# Patient Record
Sex: MALE | Race: WHITE | NOT HISPANIC OR LATINO | URBAN - METROPOLITAN AREA
[De-identification: names, ages, dates, MRNs, and addresses within clinical notes are randomized per-mention and may not be internally consistent; named-entity substitution may affect disease eponyms.]

---

## 2023-10-17 ENCOUNTER — PHARMACY VISIT (OUTPATIENT)
Dept: PHARMACY | Facility: CLINIC | Age: 68
End: 2023-10-17
Payer: COMMERCIAL

## 2023-10-17 ENCOUNTER — SPECIALTY PHARMACY (OUTPATIENT)
Dept: PHARMACY | Facility: CLINIC | Age: 68
End: 2023-10-17

## 2023-10-23 ENCOUNTER — SPECIALTY PHARMACY (OUTPATIENT)
Dept: PHARMACY | Facility: CLINIC | Age: 68
End: 2023-10-23

## 2023-10-23 PROCEDURE — RXMED WILLOW AMBULATORY MEDICATION CHARGE

## 2023-11-17 ENCOUNTER — SPECIALTY PHARMACY (OUTPATIENT)
Dept: PHARMACY | Facility: CLINIC | Age: 68
End: 2023-11-17

## 2023-11-20 ENCOUNTER — SPECIALTY PHARMACY (OUTPATIENT)
Dept: PHARMACY | Facility: CLINIC | Age: 68
End: 2023-11-20

## 2023-11-21 ENCOUNTER — PHARMACY VISIT (OUTPATIENT)
Dept: PHARMACY | Facility: CLINIC | Age: 68
End: 2023-11-21
Payer: COMMERCIAL

## 2023-11-21 PROCEDURE — RXMED WILLOW AMBULATORY MEDICATION CHARGE

## 2023-11-21 RX ORDER — RUXOLITINIB 10 MG/1
TABLET ORAL
Qty: 60 TABLET | Refills: 5 | OUTPATIENT
Start: 2023-11-20 | End: 2023-12-18 | Stop reason: DRUGHIGH

## 2023-12-07 PROCEDURE — 88305 TISSUE EXAM BY PATHOLOGIST: CPT | Performed by: DERMATOLOGY

## 2023-12-07 PROCEDURE — 88305 TISSUE EXAM BY PATHOLOGIST: CPT

## 2023-12-11 ENCOUNTER — LAB REQUISITION (OUTPATIENT)
Dept: LAB | Facility: HOSPITAL | Age: 68
End: 2023-12-11
Payer: COMMERCIAL

## 2023-12-11 DIAGNOSIS — D48.5 NEOPLASM OF UNCERTAIN BEHAVIOR OF SKIN: ICD-10-CM

## 2023-12-12 ENCOUNTER — SPECIALTY PHARMACY (OUTPATIENT)
Dept: PHARMACY | Facility: CLINIC | Age: 68
End: 2023-12-12

## 2023-12-12 LAB
LABORATORY COMMENT REPORT: NORMAL
PATH REPORT.FINAL DX SPEC: NORMAL
PATH REPORT.GROSS SPEC: NORMAL
PATH REPORT.MICROSCOPIC SPEC OTHER STN: NORMAL
PATH REPORT.RELEVANT HX SPEC: NORMAL
PATH REPORT.TOTAL CANCER: NORMAL

## 2023-12-18 PROCEDURE — RXMED WILLOW AMBULATORY MEDICATION CHARGE

## 2023-12-19 ENCOUNTER — SPECIALTY PHARMACY (OUTPATIENT)
Dept: PHARMACY | Facility: CLINIC | Age: 68
End: 2023-12-19

## 2023-12-19 ENCOUNTER — PHARMACY VISIT (OUTPATIENT)
Dept: PHARMACY | Facility: CLINIC | Age: 68
End: 2023-12-19
Payer: COMMERCIAL

## 2023-12-26 ENCOUNTER — SPECIALTY PHARMACY (OUTPATIENT)
Dept: PHARMACY | Facility: CLINIC | Age: 68
End: 2023-12-26

## 2023-12-26 NOTE — PROGRESS NOTES
[facility] Clinical Note    Valeriy Negron is a 68 y.o. male, who is on the specialty pharmacy service for management of: Oncology Core with status of: (Enrolled)     Valeriy was contacted on 12/26/2023.    Refer to the encounter summary report for documentation details about patient counseling and education.      Medication Adherence  The importance of adherence was discussed with the patient and they were advised to take the medication as prescribed by their provider. Valeriy was encouraged to call his physician's office if they have a question regarding a missed dose.     Conclusion  Rate your quality of life on scale of 1-10: 8 (Recently retired)  Rate your satisfaction with  Specialty Pharmacy on scale of 1-10: 10 - Completely satisfied      Patient advised to contact the pharmacy if there are any changes to her medication list, including prescriptions, OTC medications, herbal products, or supplements. Patient was advised of [facility]’s dispensing process, refill timeline, contact information ([phone]), and patient management follow up. Patient confirmed understanding of education conducted during assessment. All patient questions and concerns were addressed to the best of my ability. Patient was encouraged to contact the specialty pharmacy with any questions or concerns.    Confirmed follow-up outreaches are properly scheduled. Reviewed goals of therapy in the program targets.    Carla Loja, PharmD

## 2024-01-10 ENCOUNTER — SPECIALTY PHARMACY (OUTPATIENT)
Dept: PHARMACY | Facility: CLINIC | Age: 69
End: 2024-01-10

## 2024-01-10 PROCEDURE — RXMED WILLOW AMBULATORY MEDICATION CHARGE

## 2024-01-11 ENCOUNTER — PHARMACY VISIT (OUTPATIENT)
Dept: PHARMACY | Facility: CLINIC | Age: 69
End: 2024-01-11
Payer: COMMERCIAL

## 2024-02-02 ENCOUNTER — SPECIALTY PHARMACY (OUTPATIENT)
Dept: PHARMACY | Facility: CLINIC | Age: 69
End: 2024-02-02

## 2024-02-02 ENCOUNTER — PHARMACY VISIT (OUTPATIENT)
Dept: PHARMACY | Facility: CLINIC | Age: 69
End: 2024-02-02
Payer: COMMERCIAL

## 2024-02-02 PROCEDURE — RXMED WILLOW AMBULATORY MEDICATION CHARGE

## 2024-03-07 ENCOUNTER — SPECIALTY PHARMACY (OUTPATIENT)
Dept: PHARMACY | Facility: CLINIC | Age: 69
End: 2024-03-07

## 2024-04-05 ENCOUNTER — SPECIALTY PHARMACY (OUTPATIENT)
Dept: PHARMACY | Facility: CLINIC | Age: 69
End: 2024-04-05

## 2024-08-09 ENCOUNTER — APPOINTMENT (OUTPATIENT)
Dept: HEMATOLOGY/ONCOLOGY | Facility: HOSPITAL | Age: 69
End: 2024-08-09

## 2024-08-15 ENCOUNTER — APPOINTMENT (OUTPATIENT)
Dept: HEMATOLOGY/ONCOLOGY | Facility: HOSPITAL | Age: 69
End: 2024-08-15
Payer: COMMERCIAL

## 2024-08-27 ENCOUNTER — APPOINTMENT (OUTPATIENT)
Dept: HEMATOLOGY/ONCOLOGY | Facility: CLINIC | Age: 69
End: 2024-08-27
Payer: COMMERCIAL

## 2024-09-17 ENCOUNTER — APPOINTMENT (OUTPATIENT)
Dept: HEMATOLOGY/ONCOLOGY | Facility: CLINIC | Age: 69
End: 2024-09-17
Payer: COMMERCIAL

## 2024-10-18 ENCOUNTER — LAB (OUTPATIENT)
Dept: LAB | Facility: HOSPITAL | Age: 69
End: 2024-10-18
Payer: MEDICARE

## 2024-10-18 ENCOUNTER — OFFICE VISIT (OUTPATIENT)
Dept: HEMATOLOGY/ONCOLOGY | Facility: HOSPITAL | Age: 69
End: 2024-10-18
Payer: MEDICARE

## 2024-10-18 ENCOUNTER — DOCUMENTATION (OUTPATIENT)
Dept: OTHER | Facility: HOSPITAL | Age: 69
End: 2024-10-18
Payer: COMMERCIAL

## 2024-10-18 VITALS
OXYGEN SATURATION: 100 % | SYSTOLIC BLOOD PRESSURE: 131 MMHG | WEIGHT: 203 LBS | TEMPERATURE: 96.8 F | HEART RATE: 53 BPM | DIASTOLIC BLOOD PRESSURE: 80 MMHG | BODY MASS INDEX: 26.79 KG/M2

## 2024-10-18 DIAGNOSIS — D45 PV (POLYCYTHEMIA VERA) (MULTI): ICD-10-CM

## 2024-10-18 DIAGNOSIS — D45 POLYCYTHEMIA VERA: ICD-10-CM

## 2024-10-18 DIAGNOSIS — Z76.82 STEM CELL TRANSPLANT CANDIDATE: ICD-10-CM

## 2024-10-18 DIAGNOSIS — I63.9 CEREBROVASCULAR ACCIDENT (CVA), UNSPECIFIED MECHANISM (MULTI): Primary | ICD-10-CM

## 2024-10-18 LAB
ALBUMIN SERPL BCP-MCNC: 4.7 G/DL (ref 3.4–5)
ALP SERPL-CCNC: 44 U/L (ref 33–136)
ALT SERPL W P-5'-P-CCNC: 23 U/L (ref 10–52)
ANION GAP SERPL CALC-SCNC: 11 MMOL/L (ref 10–20)
APTT PPP: 34 SECONDS (ref 27–38)
AST SERPL W P-5'-P-CCNC: 28 U/L (ref 9–39)
BASOPHILS # BLD MANUAL: 0.72 X10*3/UL (ref 0–0.1)
BASOPHILS NFR BLD MANUAL: 4 %
BILIRUB SERPL-MCNC: 0.9 MG/DL (ref 0–1.2)
BUN SERPL-MCNC: 18 MG/DL (ref 6–23)
BURR CELLS BLD QL SMEAR: ABNORMAL
CALCIUM SERPL-MCNC: 10.2 MG/DL (ref 8.6–10.3)
CHLORIDE SERPL-SCNC: 104 MMOL/L (ref 98–107)
CO2 SERPL-SCNC: 28 MMOL/L (ref 21–32)
CREAT SERPL-MCNC: 0.88 MG/DL (ref 0.5–1.3)
DACRYOCYTES BLD QL SMEAR: ABNORMAL
EGFRCR SERPLBLD CKD-EPI 2021: >90 ML/MIN/1.73M*2
EOSINOPHIL # BLD MANUAL: 1.79 X10*3/UL (ref 0–0.7)
EOSINOPHIL NFR BLD MANUAL: 10 %
ERYTHROCYTE [DISTWIDTH] IN BLOOD BY AUTOMATED COUNT: 18.8 % (ref 11.5–14.5)
FACT VIII ACT/NOR PPP: 103 % (ref 55–180)
FIBRINOGEN PPP-MCNC: 257 MG/DL (ref 200–400)
GLUCOSE SERPL-MCNC: 94 MG/DL (ref 74–99)
HCT VFR BLD AUTO: 38.2 % (ref 41–52)
HGB BLD-MCNC: 11.9 G/DL (ref 13.5–17.5)
IMM GRANULOCYTES # BLD AUTO: 1.92 X10*3/UL (ref 0–0.7)
IMM GRANULOCYTES NFR BLD AUTO: 10.7 % (ref 0–0.9)
INR PPP: 1.1 (ref 0.9–1.1)
LDH SERPL L TO P-CCNC: 330 U/L (ref 84–246)
LYMPHOCYTES # BLD MANUAL: 0.9 X10*3/UL (ref 1.2–4.8)
LYMPHOCYTES NFR BLD MANUAL: 5 %
MCH RBC QN AUTO: 31.2 PG (ref 26–34)
MCHC RBC AUTO-ENTMCNC: 31.2 G/DL (ref 32–36)
MCV RBC AUTO: 100 FL (ref 80–100)
METAMYELOCYTES # BLD MANUAL: 0.54 X10*3/UL
METAMYELOCYTES NFR BLD MANUAL: 3 %
MONOCYTES # BLD MANUAL: 0.9 X10*3/UL (ref 0.1–1)
MONOCYTES NFR BLD MANUAL: 5 %
MYELOCYTES # BLD MANUAL: 0.9 X10*3/UL
MYELOCYTES NFR BLD MANUAL: 5 %
NEUTROPHILS # BLD MANUAL: 11.99 X10*3/UL (ref 1.2–7.7)
NEUTS BAND # BLD MANUAL: 1.61 X10*3/UL (ref 0–0.7)
NEUTS BAND NFR BLD MANUAL: 9 %
NEUTS SEG # BLD MANUAL: 10.38 X10*3/UL (ref 1.2–7)
NEUTS SEG NFR BLD MANUAL: 58 %
NRBC BLD-RTO: 0.5 /100 WBCS (ref 0–0)
OVALOCYTES BLD QL SMEAR: ABNORMAL
PLATELET # BLD AUTO: 414 X10*3/UL (ref 150–450)
POLYCHROMASIA BLD QL SMEAR: ABNORMAL
POTASSIUM SERPL-SCNC: 4.2 MMOL/L (ref 3.5–5.3)
PROT SERPL-MCNC: 7 G/DL (ref 6.4–8.2)
PROTHROMBIN TIME: 12.4 SECONDS (ref 9.8–12.8)
RBC # BLD AUTO: 3.82 X10*6/UL (ref 4.5–5.9)
RBC MORPH BLD: ABNORMAL
SCHISTOCYTES BLD QL SMEAR: ABNORMAL
SODIUM SERPL-SCNC: 139 MMOL/L (ref 136–145)
TOTAL CELLS COUNTED BLD: 100
URATE SERPL-MCNC: 4.8 MG/DL (ref 4–7.5)
VARIANT LYMPHS # BLD MANUAL: 0.18 X10*3/UL (ref 0–0.5)
VARIANT LYMPHS NFR BLD: 1 %
VWF AG ACT/NOR PPP IA: 113 % (ref 50–220)
WBC # BLD AUTO: 17.9 X10*3/UL (ref 4.4–11.3)

## 2024-10-18 PROCEDURE — 81371 HLA I & II TYPE VERIFY LR: CPT | Mod: OUT,59 | Performed by: STUDENT IN AN ORGANIZED HEALTH CARE EDUCATION/TRAINING PROGRAM

## 2024-10-18 PROCEDURE — 85246 CLOT FACTOR VIII VW ANTIGEN: CPT

## 2024-10-18 PROCEDURE — 83615 LACTATE (LD) (LDH) ENZYME: CPT

## 2024-10-18 PROCEDURE — 81382 HLA II TYPING 1 LOC HR: CPT | Mod: 59

## 2024-10-18 PROCEDURE — 85247 CLOT FACTOR VIII MULTIMETRIC: CPT

## 2024-10-18 PROCEDURE — 1126F AMNT PAIN NOTED NONE PRSNT: CPT | Performed by: STUDENT IN AN ORGANIZED HEALTH CARE EDUCATION/TRAINING PROGRAM

## 2024-10-18 PROCEDURE — 84550 ASSAY OF BLOOD/URIC ACID: CPT

## 2024-10-18 PROCEDURE — 85730 THROMBOPLASTIN TIME PARTIAL: CPT

## 2024-10-18 PROCEDURE — 85610 PROTHROMBIN TIME: CPT

## 2024-10-18 PROCEDURE — 99215 OFFICE O/P EST HI 40 MIN: CPT | Performed by: STUDENT IN AN ORGANIZED HEALTH CARE EDUCATION/TRAINING PROGRAM

## 2024-10-18 PROCEDURE — 36415 COLL VENOUS BLD VENIPUNCTURE: CPT

## 2024-10-18 PROCEDURE — 85245 CLOT FACTOR VIII VW RISTOCTN: CPT

## 2024-10-18 PROCEDURE — 85240 CLOT FACTOR VIII AHG 1 STAGE: CPT

## 2024-10-18 PROCEDURE — 1159F MED LIST DOCD IN RCRD: CPT | Performed by: STUDENT IN AN ORGANIZED HEALTH CARE EDUCATION/TRAINING PROGRAM

## 2024-10-18 PROCEDURE — 86832 HLA CLASS I HIGH DEFIN QUAL: CPT

## 2024-10-18 PROCEDURE — 84075 ASSAY ALKALINE PHOSPHATASE: CPT

## 2024-10-18 PROCEDURE — 85027 COMPLETE CBC AUTOMATED: CPT

## 2024-10-18 PROCEDURE — 85007 BL SMEAR W/DIFF WBC COUNT: CPT

## 2024-10-18 PROCEDURE — 85384 FIBRINOGEN ACTIVITY: CPT

## 2024-10-18 RX ORDER — CLOPIDOGREL BISULFATE 75 MG/1
75 TABLET ORAL DAILY
COMMUNITY

## 2024-10-18 RX ORDER — GLUCOSAM/CHONDRO/HERB 149/HYAL 750-100 MG
1000 TABLET ORAL DAILY
COMMUNITY

## 2024-10-18 RX ORDER — ATORVASTATIN CALCIUM 80 MG/1
80 TABLET, FILM COATED ORAL DAILY
COMMUNITY

## 2024-10-18 RX ORDER — GLUCOSAMINE/CHONDRO SU A 500-400 MG
1 TABLET ORAL DAILY
COMMUNITY

## 2024-10-18 RX ORDER — MINERAL OIL
180 ENEMA (ML) RECTAL DAILY
COMMUNITY

## 2024-10-18 RX ORDER — AMLODIPINE BESYLATE 5 MG/1
TABLET ORAL DAILY
COMMUNITY

## 2024-10-18 RX ORDER — LOSARTAN POTASSIUM AND HYDROCHLOROTHIAZIDE 12.5; 1 MG/1; MG/1
1 TABLET ORAL DAILY
COMMUNITY

## 2024-10-18 RX ORDER — FLUTICASONE PROPIONATE 50 MCG
1 SPRAY, SUSPENSION (ML) NASAL DAILY
COMMUNITY

## 2024-10-18 RX ORDER — ASPIRIN 81 MG/1
81 TABLET ORAL DAILY
COMMUNITY

## 2024-10-18 ASSESSMENT — PAIN SCALES - GENERAL: PAINLEVEL_OUTOF10: 0-NO PAIN

## 2024-10-18 NOTE — PROGRESS NOTES
Patient ID: Valeriy Negron is a 69 y.o. male.  Referring Physician: Camilla Mishra MD  [address]  [place] [address]  Primary Care Provider: Fernando Valencia MD    Oncology History Overview Note   PV Diagnosis:  - Increased immature granulocytes starting in 2015  - CBC 1/2020:  WBC 12.6, eos 3.226, basos 4.03, Hgb 18.2, plts 416  - CBC 8/11/21:  WBC 12.6, ANC 8.6, Eos 2.91, basos 0.62, Hgb 18.1, plts 443  - Referred to Dr. Mishra ([facility]), evaluated 9/3/21.  Epo 1.4 mIU/mL, folate normal  - BMBx 10/8/21 was hypercellular (50-60%) with panmyelosis, MK atypia, no increase in blasts, no increase in reticulin fibrosis (MF-0), and JAK2 V617F mutation by myeloid NGS consistent with JAK2 positive MPN, most consistent with PV.  Other mutation by NGS:  SF3B1 K666N (associated with inferior survival in ET).  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  JAK2 V617F mut detected.  FISH neg for rearrangements in PDGFRa, PDGFRb, FGFR1, BCR/ABL.  CBC:  WBC 17.4 (no blasts), Hgb 11.2 (MCV 96.7), pts 410.    PV Management:  Phlebotomy 10/22/21-1/2022  Hydrea 500 BID 11/2021-9/2023  ASA held 2023, resumed 4/2024  Rux 9/2023-present (10mg BID -> 15 mg BID)    CVA History:  4/2023:  corona radiata stroke.  Remembers he was watching TV, went to get up, had a hard time getting up, then fell.  Got up, tried to take another step and fell.  Went into the bedroom, fell, took 1 day off, then went back to work the next day.  Saw GP on Saturday, had PT, started Plavix and statin.  Referred to a neurologist, was told he had imaging evidence of a previous CVA as well.      4/2024:  thirdCVA.  Went to the gym that morning.  Couldn't move right side.  Called squad, distal L A2 occlusion, got thrombolysis.  Loop recorder 6 weeks later.     Polycythemia vera   10/21/2024 Initial  "Diagnosis    Polycythemia vera            Subjective    Mr. Negron presents today for repeat consultation for polycythemia vera.  Diagnosis and treatment history summarized above.    Current active issues are recurrent strokes (despite hematocrit at goal) and bruising/bleeding on DAPT.  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    Stroke history summarized above.  Regarding residual deficits, he has difficulty with short term memory and name recall (started before strokes but more noticeable afterwards).  Has to be aware of R foot, as it often \"catches\" on things if he isn't careful.  Also reports some deficits in R handed dexterity.    Regarding bruising/bleeding, he had intractable bleeding after loop recorder placement (DAPT was not held).  He also bruises easily.  He has no noticeable oozing from his mouth, bleeding when he brushes his teeth, hematuria, hematochezia, or melena.    He has an excellent performance status; he recently returned from a week of golf in [place].  Ready to go back to the gym (elliptical, rowing machine, lifts weights \"a little\").  Headed to [place] in a few weeks.  He helps babysit his grandson who is 22 months old.      Vision problems \"fixed with prisms\" added to his glasses.      PMH:  HTN     PSH:  5 knee surgeries (b/l TKA)     FH:  MGM - MI  Mother -  of lymphoma age 85 (unknown subtype, was in the abdomen)  Father -  of DM complications when pt was 6 yo  Sister - psoriasis  Half sister and half brother - healthy  Son - 39 yo, healthy     SH:  lives with wife  works as an , denies occupational exposures  never smoker  drinks wine occasionally  no illicits  no pets    Review of Systems   Constitutional: Negative.    HENT:  Negative.     Eyes: Negative.    Respiratory: Negative.     Cardiovascular: Negative.    Gastrointestinal: Negative.    Endocrine: Negative.  "   Genitourinary: Negative.     Musculoskeletal:  Positive for gait problem.   Skin: Negative.    Neurological:  Positive for gait problem.        Short term memory difficulties   Hematological:  Bruises/bleeds easily.   Psychiatric/Behavioral: Negative.           Objective   BSA: 2.18 meters squared  /80 (BP Location: Right arm, Patient Position: Sitting, BP Cuff Size: Adult)   Pulse 53   Temp 36 °C (96.8 °F) (Temporal)   Wt 92.1 kg (203 lb)   SpO2 100%   BMI 26.79 kg/m²       Physical Exam  Vitals reviewed.   HENT:      Head: Normocephalic and atraumatic.      Right Ear: External ear normal.      Left Ear: External ear normal.      Nose: Nose normal.      Mouth/Throat:      Mouth: Mucous membranes are moist.      Pharynx: Oropharynx is clear.   Eyes:      Extraocular Movements: Extraocular movements intact.      Conjunctiva/sclera: Conjunctivae normal.      Pupils: Pupils are equal, round, and reactive to light.   Cardiovascular:      Rate and Rhythm: Normal rate and regular rhythm.   Pulmonary:      Effort: Pulmonary effort is normal.      Breath sounds: Normal breath sounds.   Abdominal:      Palpations: Abdomen is soft.      Tenderness: There is no abdominal tenderness.   Musculoskeletal:         General: Normal range of motion.      Cervical back: Normal range of motion.   Skin:     General: Skin is warm and dry.   Neurological:      General: No focal deficit present.      Mental Status: He is alert.   Psychiatric:         Mood and Affect: Mood normal.         Behavior: Behavior normal.         Thought Content: Thought content normal.       CT a/p 5/2024:  no splenomegaly    Results from last 7 days   Lab Units 10/18/24  1321   WBC AUTO x10*3/uL 17.9*   HEMOGLOBIN g/dL 11.9*   HEMATOCRIT % 38.2*   PLATELETS AUTO x10*3/uL 414   LYMPHO PCT MAN % 5.0   MONO PCT MAN % 5.0   EOSINO PCT MAN % 10.0       Results from last 7 days   Lab Units 10/18/24  1321   SODIUM mmol/L 139   POTASSIUM mmol/L 4.2    CHLORIDE mmol/L 104   CO2 mmol/L 28   BUN mg/dL 18   CREATININE mg/dL 0.88   CALCIUM mg/dL 10.2   PROTEIN TOTAL g/dL 7.0   BILIRUBIN TOTAL mg/dL 0.9   ALK PHOS U/L 44   ALT U/L 23   AST U/L 28   GLUCOSE mg/dL 94              Performance Status:  Symptomatic; fully ambulatory    Assessment/Plan      Mr. Negron presents today for reassessment of PV with course c/b recurrent strokes despite successful hematocrit control (may be more related to leukocytosis).  It is also possible that his underlying diagnosis may overlap somewhat with MDS.    He is in good shape physically and has no splenomegaly (as of imaging 6 months ago), so he appears to be a good transplant candidate if diagnosis is appropriate.  We discussed that transplant could be a potentially curative treatment for him, and he is amenable to workup.    Plan:  - Repeat BMBx (CT guided) for diagnostic clarification  - I will refer him to stroke neurology here for opinion on optimal AC and to help clarify etiology of recurrent CVAs  - HLA typing and allo transplant referral  - Will obtain von Willebrand studies to rule out acquired vWD although I suspect bruising/bleeding is more related to DAPT  - Follow up with me on 12/3 at [place]         Susi Julio MD PhD

## 2024-10-18 NOTE — PROGRESS NOTES
Patient Education  Learner: patient and significant other  Educated on: 10/17/24  Readiness: acceptance  Preferred learning method: written, verbal, video  Method used: explanation, handout, and video  Response: needs reinforcement  Barriers: none  Preferred language: English    I met with Valeriy and his wife Ada today for allogenic stem cell transplant education. We discussed the general concept of transplant including chemotherapy administration and cell infusion. We reviewed the workup process including organ function testing and laboratory testing, required for MD and financial clearance to proceed with stem cell collection and transplant. We discussed hospital admission for transplant and that Valeriy will remain in the hospital for 4-6 weeks. Pt provided Allo binder, [facility] Allo education video link and Allo education checklist. We do not know donor or preparatory regimen Valeriy will be receiving if it is determined he will pursue transplant.  Administration and potential side effects of this Allo PBSCT were reviewed and patient will be provided Lexicomp material specific to all medications once known. We discussed neutropenia, anemia, thrombocytopenia and the potential complications associated with these events. Infection prevention measures such as strict hand washing, low pathogen diet, daily showering/CHG bathing and frequent walking were reviewed.  Patient was provided with patient information sheets on transplant related topics. 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 GVHD booklet provided. Anti rejection meds reviewed as well. We discussed the goals of discharge and the need for a caregiver and someone to accompany him to post transplant visits in the [facility], which will occur 2-3 times per week, at minimum.  We discussed the importance of having a reliable caregiver post-transplant to drive patient to appointments, help with medication adherence, and assist with ADLS such as  cooking and cleaning. The patient verbalized understanding of these measures and his wife states that she will be the primary caregiver. They live in [place] so lodging will not be needed. Patient will be  given copies of all applicable consents related to upcoming therapy and transplant so they can review prior to signing with Dr. Julio Attending. They are aware SW will be reaching out once it is decided if moving forward with transplant and timeline established. My contact information provided.

## 2024-10-18 NOTE — LETTER
October 21, 2024     Camilla Mishra MD  [address]  [facility] [address]    Patient: Valeriy Negron   YOB: 1955   Date of Visit: 10/18/2024       Dear Dr. Camilla Mishra MD:    Thank you for referring Valeriy Negron to me for evaluation. Below are my notes for this consultation.  If you have questions, please do not hesitate to call me. I look forward to following your patient along with you.       Sincerely,     Susi Julio MD PhD      CC: MD Fernando Lara MD  ______________________________________________________________________________________    Patient ID: Valeriy Negron is a 69 y.o. male.  Referring Physician: Camilla Mishra MD  [address]  [place] [address]  Primary Care Provider: Fernando Valencia MD    Oncology History Overview Note   PV Diagnosis:  - Increased immature granulocytes starting in 2015  - CBC 1/2020:  WBC 12.6, eos 3.226, basos 4.03, Hgb 18.2, plts 416  - CBC 8/11/21:  WBC 12.6, ANC 8.6, Eos 2.91, basos 0.62, Hgb 18.1, plts 443  - Referred to Dr. Mishra ([facility]), evaluated 9/3/21.  Epo 1.4 mIU/mL, folate normal  - BMBx 10/8/21 was hypercellular (50-60%) with panmyelosis, MK atypia, no increase in blasts, no increase in reticulin fibrosis (MF-0), and JAK2 V617F mutation by myeloid NGS consistent with JAK2 positive MPN, most consistent with PV.  Other mutation by NGS:  SF3B1 K666N (associated with inferior survival in ET).  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  JAK2 V617F mut detected.  FISH neg for rearrangements in PDGFRa, PDGFRb, FGFR1, BCR/ABL.  CBC:  WBC 17.4 (no blasts), Hgb 11.2 (MCV 96.7), pts 410.    PV Management:  Phlebotomy 10/22/21-1/2022  Hydrea 500 BID 11/2021-9/2023  ASA held 2023, resumed 4/2024  Rux 9/2023-present  "(10mg BID -> 15 mg BID)    CVA History:  4/2023:  corona radiata stroke.  Remembers he was watching TV, went to get up, had a hard time getting up, then fell.  Got up, tried to take another step and fell.  Went into the bedroom, fell, took 1 day off, then went back to work the next day.  Saw GP on Saturday, had PT, started Plavix and statin.  Referred to a neurologist, was told he had imaging evidence of a previous CVA as well.      4/2024:  thirdCVA.  Went to the gym that morning.  Couldn't move right side.  Called squad, distal L A2 occlusion, got thrombolysis.  Loop recorder 6 weeks later.     Polycythemia vera   10/21/2024 Initial Diagnosis    Polycythemia vera            Subjective   Mr. Negron presents today for repeat consultation for polycythemia vera.  Diagnosis and treatment history summarized above.    Current active issues are recurrent strokes (despite hematocrit at goal) and bruising/bleeding on DAPT.  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    Stroke history summarized above.  Regarding residual deficits, he has difficulty with short term memory and name recall (started before strokes but more noticeable afterwards).  Has to be aware of R foot, as it often \"catches\" on things if he isn't careful.  Also reports some deficits in R handed dexterity.    Regarding bruising/bleeding, he had intractable bleeding after loop recorder placement (DAPT was not held).  He also bruises easily.  He has no noticeable oozing from his mouth, bleeding when he brushes his teeth, hematuria, hematochezia, or melena.    He has an excellent performance status; he recently returned from a week of golf in [place].  Ready to go back to the gym (elliptical, rowing machine, lifts weights \"a little\").  Headed to [place] in a few weeks.  He helps babysit his grandson who is 22 months old.      Vision problems \"fixed with " "prisms\" added to his glasses.      PMH:  HTN     PSH:  5 knee surgeries (b/l TKA)     FH:  MGM - MI  Mother -  of lymphoma age 85 (unknown subtype, was in the abdomen)  Father -  of DM complications when pt was 4 yo  Sister - psoriasis  Half sister and half brother - healthy  Son - 37 yo, healthy     SH:  lives with wife  works as an , denies occupational exposures  never smoker  drinks wine occasionally  no illicits  no pets    Review of Systems   Constitutional: Negative.    HENT:  Negative.     Eyes: Negative.    Respiratory: Negative.     Cardiovascular: Negative.    Gastrointestinal: Negative.    Endocrine: Negative.    Genitourinary: Negative.     Musculoskeletal:  Positive for gait problem.   Skin: Negative.    Neurological:  Positive for gait problem.        Short term memory difficulties   Hematological:  Bruises/bleeds easily.   Psychiatric/Behavioral: Negative.           Objective  BSA: 2.18 meters squared  /80 (BP Location: Right arm, Patient Position: Sitting, BP Cuff Size: Adult)   Pulse 53   Temp 36 °C (96.8 °F) (Temporal)   Wt 92.1 kg (203 lb)   SpO2 100%   BMI 26.79 kg/m²       Physical Exam  Vitals reviewed.   HENT:      Head: Normocephalic and atraumatic.      Right Ear: External ear normal.      Left Ear: External ear normal.      Nose: Nose normal.      Mouth/Throat:      Mouth: Mucous membranes are moist.      Pharynx: Oropharynx is clear.   Eyes:      Extraocular Movements: Extraocular movements intact.      Conjunctiva/sclera: Conjunctivae normal.      Pupils: Pupils are equal, round, and reactive to light.   Cardiovascular:      Rate and Rhythm: Normal rate and regular rhythm.   Pulmonary:      Effort: Pulmonary effort is normal.      Breath sounds: Normal breath sounds.   Abdominal:      Palpations: Abdomen is soft.      Tenderness: There is no abdominal tenderness.   Musculoskeletal:         General: Normal range of motion.      Cervical back: Normal range of " motion.   Skin:     General: Skin is warm and dry.   Neurological:      General: No focal deficit present.      Mental Status: He is alert.   Psychiatric:         Mood and Affect: Mood normal.         Behavior: Behavior normal.         Thought Content: Thought content normal.       CT a/p 5/2024:  no splenomegaly    Results from last 7 days   Lab Units 10/18/24  1321   WBC AUTO x10*3/uL 17.9*   HEMOGLOBIN g/dL 11.9*   HEMATOCRIT % 38.2*   PLATELETS AUTO x10*3/uL 414   LYMPHO PCT MAN % 5.0   MONO PCT MAN % 5.0   EOSINO PCT MAN % 10.0       Results from last 7 days   Lab Units 10/18/24  1321   SODIUM mmol/L 139   POTASSIUM mmol/L 4.2   CHLORIDE mmol/L 104   CO2 mmol/L 28   BUN mg/dL 18   CREATININE mg/dL 0.88   CALCIUM mg/dL 10.2   PROTEIN TOTAL g/dL 7.0   BILIRUBIN TOTAL mg/dL 0.9   ALK PHOS U/L 44   ALT U/L 23   AST U/L 28   GLUCOSE mg/dL 94              Performance Status:  Symptomatic; fully ambulatory    Assessment/Plan     Mr. Negron presents today for reassessment of PV with course c/b recurrent strokes despite successful hematocrit control (may be more related to leukocytosis).  It is also possible that his underlying diagnosis may overlap somewhat with MDS.    He is in good shape physically and has no splenomegaly (as of imaging 6 months ago), so he appears to be a good transplant candidate if diagnosis is appropriate.  We discussed that transplant could be a potentially curative treatment for him, and he is amenable to workup.    Plan:  - Repeat BMBx (CT guided) for diagnostic clarification  - I will refer him to stroke neurology here for opinion on optimal AC and to help clarify etiology of recurrent CVAs  - HLA typing and allo transplant referral  - Will obtain von Willebrand studies to rule out acquired vWD although I suspect bruising/bleeding is more related to DAPT  - Follow up with me on 12/3 at [place]         Susi Julio MD PhD

## 2024-10-18 NOTE — PATIENT INSTRUCTIONS
Labs today including von Willebrand testing, HLA typing, coagulation testing.  We will schedule CT guided bone marrow biopsy after your return.  I will refer you to the stroke specialist I recommend, Dr. Linda Wyatt (will coordinate if possible).  I will touch base with Dr. Mishra, Dr. Valencia, and Dr. Trivedi.

## 2024-10-21 PROBLEM — D45 POLYCYTHEMIA VERA: Status: ACTIVE | Noted: 2024-10-21

## 2024-10-21 ASSESSMENT — ENCOUNTER SYMPTOMS
EYES NEGATIVE: 1
BRUISES/BLEEDS EASILY: 1
RESPIRATORY NEGATIVE: 1
CARDIOVASCULAR NEGATIVE: 1
PSYCHIATRIC NEGATIVE: 1
CONSTITUTIONAL NEGATIVE: 1
ENDOCRINE NEGATIVE: 1
GASTROINTESTINAL NEGATIVE: 1

## 2024-10-23 LAB — VWF:RCO ACT/NOR PPP PL AGG: 82 % (ref 51–215)

## 2024-10-25 LAB
HLA RESULTS: NORMAL
HLA-A+B+C AB NFR SER: NORMAL %
HLA-DP+DQ+DR AB NFR SER: NORMAL %

## 2024-10-25 PROCEDURE — 99001 SPECIMEN HANDLING PT-LAB: CPT | Mod: OUT | Performed by: STUDENT IN AN ORGANIZED HEALTH CARE EDUCATION/TRAINING PROGRAM

## 2024-10-28 LAB — VWF MULTIMERS PPP IB: NORMAL

## 2024-10-29 ENCOUNTER — LAB REQUISITION (OUTPATIENT)
Dept: LAB | Facility: CLINIC | Age: 69
End: 2024-10-29
Payer: MEDICARE

## 2024-10-29 DIAGNOSIS — D45 POLYCYTHEMIA VERA: ICD-10-CM

## 2024-10-29 LAB
SPECIMEN HANDLING: NORMAL

## 2024-10-30 ENCOUNTER — LAB REQUISITION (OUTPATIENT)
Dept: LAB | Facility: CLINIC | Age: 69
End: 2024-10-30
Payer: MEDICARE

## 2024-10-30 DIAGNOSIS — D45 POLYCYTHEMIA VERA: ICD-10-CM

## 2024-11-01 ENCOUNTER — LAB REQUISITION (OUTPATIENT)
Dept: LAB | Facility: CLINIC | Age: 69
End: 2024-11-01
Payer: MEDICARE

## 2024-11-01 LAB
HLA CLS I TYP PNL BLD/T DONR HIGH RES: NORMAL
HLA RESULTS: NORMAL
HLA-DP2 QL: NORMAL
HLA-DQB1 HIGH RES: NORMAL
HLA-DRB1 HIGH RES: NORMAL

## 2024-11-04 ENCOUNTER — HOSPITAL ENCOUNTER (OUTPATIENT)
Dept: RADIOLOGY | Facility: EXTERNAL LOCATION | Age: 69
Discharge: HOME | End: 2024-11-04
Payer: MEDICARE

## 2024-11-05 LAB
DNA CLASS I + II VERIFICATION TYPING: NORMAL
HLA CLS I TYP PNL BLD/T DONR HIGH RES: NORMAL
HLA RESULTS: NORMAL
HLA RESULTS: NORMAL
HLA-DP2 QL: NORMAL
HLA-DQB1 HIGH RES: NORMAL
HLA-DRB1 HIGH RES: NORMAL

## 2024-11-18 ENCOUNTER — OFFICE VISIT (OUTPATIENT)
Dept: NEUROLOGY | Facility: CLINIC | Age: 69
End: 2024-11-18
Payer: MEDICARE

## 2024-11-18 VITALS
DIASTOLIC BLOOD PRESSURE: 76 MMHG | BODY MASS INDEX: 26.51 KG/M2 | HEIGHT: 73 IN | WEIGHT: 200 LBS | HEART RATE: 59 BPM | SYSTOLIC BLOOD PRESSURE: 124 MMHG

## 2024-11-18 DIAGNOSIS — I63.40 CEREBRAL INFARCTION DUE TO EMBOLISM OF CEREBRAL ARTERY (MULTI): ICD-10-CM

## 2024-11-18 DIAGNOSIS — D45 POLYCYTHEMIA VERA: ICD-10-CM

## 2024-11-18 DIAGNOSIS — I63.9 CEREBROVASCULAR ACCIDENT (CVA), UNSPECIFIED MECHANISM (MULTI): ICD-10-CM

## 2024-11-18 DIAGNOSIS — I65.22 ULCERATED ATHEROSCLEROTIC PLAQUE OF LEFT CAROTID ARTERY: Primary | ICD-10-CM

## 2024-11-18 PROCEDURE — 99215 OFFICE O/P EST HI 40 MIN: CPT | Mod: GC | Performed by: STUDENT IN AN ORGANIZED HEALTH CARE EDUCATION/TRAINING PROGRAM

## 2024-11-18 PROCEDURE — 99417 PROLNG OP E/M EACH 15 MIN: CPT | Performed by: STUDENT IN AN ORGANIZED HEALTH CARE EDUCATION/TRAINING PROGRAM

## 2024-11-18 PROCEDURE — 3008F BODY MASS INDEX DOCD: CPT | Performed by: STUDENT IN AN ORGANIZED HEALTH CARE EDUCATION/TRAINING PROGRAM

## 2024-11-18 PROCEDURE — 1159F MED LIST DOCD IN RCRD: CPT | Performed by: STUDENT IN AN ORGANIZED HEALTH CARE EDUCATION/TRAINING PROGRAM

## 2024-11-18 PROCEDURE — 99205 OFFICE O/P NEW HI 60 MIN: CPT | Performed by: STUDENT IN AN ORGANIZED HEALTH CARE EDUCATION/TRAINING PROGRAM

## 2024-11-18 ASSESSMENT — ACTIVITIES OF DAILY LIVING (ADL)
TOILET_USE: INDEPENDENT (ON AND OFF, DRESSING, WIPING)
BLADDER: CONTINENT
TOTAL_SCORE: 100
BED_TO_CHAIR_AND_BACK: INDEPENDENT
BATHING: INDEPENDENT (OR IN SHOWER)
FEEDING: INDEPENDENT
BOWELS: INDEPENDENT (INCLUDING BUTTONS, ZIPS, LACES, ETC.)
MOBILITY_LEVEL_SURFACES: INDEPENDENT (BUT MAY USE ANY AID FOR EXAMPLE, STICK) > 50 YARDS
GROOMING: INDEPENDENT FACE/HAIR/TEETH.SHAVING (IMPLEMENTS PROVIDED)
STAIRS: INDEPENDENT
DRESSING: INDEPENDENT (INCLUDING BUTTONS, ZIPS, LACES,ETC.)

## 2024-11-18 ASSESSMENT — ENCOUNTER SYMPTOMS
DEPRESSION: 0
LOSS OF SENSATION IN FEET: 1
OCCASIONAL FEELINGS OF UNSTEADINESS: 0

## 2024-11-18 NOTE — PROGRESS NOTES
Vascular Neurology NPV:    HPI: Valeriy Negron is a 69 y.o.  male with HTN, Polycythemia Vera with course c/b recurrent strokes despite successful hematocrit control (Undergoing workup with repeat BMBx to differentiate PV vs. MDS), also has Hx of L ICA narrowing (<50%) who presents to stroke clinic for evaluation of his recurrent strokes.     Stroke Hx:  He presented in April 2024 (Was on Plavix monotherapy) with R hemiparesis leg>arm and was found to have L ICA irregular appearing plaque with possible ulceration but <50% stenosis, and MRI showed scattered infarcts in L ICA territory (Both CARTER and MCA) CTA also showed a distale L CARTER occlusion. 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 CBC at that time showed Hgb of 11.7 and Hematocrit of 34.7%    He had 1 prior clinical episode in March 2023 (Was on ASA monotherapy) when he had transient mild R hemiparesis but symptoms lasted more than a day and eventually returned to baseline. Did not go to ED but saw Neurologist later who obtained MRI. Report not available to us but per wife's records it showed chronic white matter changes, and multiple old lacunar infarct but had a more subacute appearing L hemispheric infarct.    Of note he did have bleeding issues as a result of DAPT following loop recorder placement, however this has since resolved.     Stroke workup:  - MRI: Multiple old lacunar infarcts, scattered embolic infarcts in L ICA tterritory  - CTA: L ICA ulcerated plaque with no significant stenosis  - LDL 60, A1c 5.9  - CBC with no significant polycythemia during both events  - TTE: EF 72%, mild LAE, no PFO or significant valvular abnormalities   - Loop recorder placed in June 2024    ROS: All systems reviewed and are negative other than mentioned in HPI    PMH: No past medical history on file.     PSH: No past surgical history on file.     Allergies: No Known Allergies     FH: No family history on file.     SH: Lives at home  with wife, denies smoking or drug use. Occasional alcohol consumption    Objective:    Visit Vitals  /76   Pulse 59            Current Outpatient Medications:     amLODIPine (Norvasc) 5 mg tablet, Take by mouth once daily., Disp: , Rfl:     aspirin 81 mg EC tablet, Take 1 tablet (81 mg) by mouth once daily., Disp: , Rfl:     atorvastatin (Lipitor) 80 mg tablet, Take 1 tablet (80 mg) by mouth once daily., Disp: , Rfl:     clopidogrel (Plavix) 75 mg tablet, Take 1 tablet (75 mg) by mouth once daily., Disp: , Rfl:     fexofenadine (Allegra) 180 mg tablet, Take 1 tablet (180 mg) by mouth once daily., Disp: , Rfl:     fluticasone (Flonase) 50 mcg/actuation nasal spray, Administer 1 spray into each nostril once daily. Shake gently. Before first use, prime pump. After use, clean tip and replace cap., Disp: , Rfl:     losartan-hydrochlorothiazide (Hyzaar) 100-12.5 mg tablet, Take 1 tablet by mouth once daily., Disp: , Rfl:     multivitamin with minerals iron-free (Centrum Silver), Take 1 tablet by mouth once daily., Disp: , Rfl:     omega 3-dha-epa-fish oil (Fish OiL) 1,000 (120-180) mg capsule, Take 1 capsule (1,000 mg) by mouth once daily., Disp: , Rfl:     ruxolitinib (Jakafi) 15 mg tablet, Take one (1) tablet by mouth twice a day, Disp: 60 tablet, Rfl: 5    glucosamine-chondroitin 500-400 mg tablet, Take 1 tablet by mouth once daily., Disp: , Rfl:      NIH Stroke Scale: 0       Modified Krystal (mRS)  Modified Lyon Score: No symptoms.  Barthel Index  Feeding: independent  Bathing: independent (or in shower)  Grooming: independent face/hair/teeth.shaving (implements provided)  Dressing : independent (including buttons, zips, laces,etc.)  Bowels: independent (including buttons, zips, laces, etc.)  Bladder: continent  Toilet Use : independent (on and off, dressing, wiping)  Transfers (Bed to chair and back) : independent  Mobility (On level surfaces): independent (but may use any aid for example, stick) > 50  yards  Stairs: independent  Total (0-100): 100       Neurologic Exam:  NEUROLOGICAL:   Cognitive Status Exam:  Orientation: alert and oriented to person, place, time, and situation  Language: expression, and comprehension intact             Information/Knowledge: can correctly state current & past events  Concentration: can remain focused during interview    Cranial Nerves:  II: pupils equal and reactive      VF-full   III, IV, VI: EOMI with smooth pursuits and no nystagmus  V: intact to LT  VII: intact facial strength and symmetry; nasolabial folds symmetric; no facial droop  VIII: intact hearing to conversation  IX and X: clear speech; no dysarthria  XI: SCM and trapezius have 5/5 strength  XII: midline tongue position at rest and intact movement, bulk, and strength    Motor:   Bulk: Normal    Tone: Normal  Tremor: Absent  Pronator Drift: Absent     Strength:    R L  Deltoid  5 5  Biceps  5 5  Triceps  5 5    5 5    Iliopsoas  5 5  Quadriceps 5 5  Hamstrings 5 5  Dorsiflex 5 5    Reflexes:    R L  Biceps  3+ 2+  Brachioradialis 3+ 2+  Patellar  2+ 2+    Sensory:   intact and symmetric to light touch in all extremities    Coordination:   Finger-to-Nose: no ataxia; no intention or action tremor; normokinetic; no dysmetria or overshoot  Heel-to-Shin: no ataxia; no intention or action tremor; normokinetic    Gait:   Straightaway gait is stable with normal step width and normal stride length. The walk is steady w/o scissoring, shuffling, foot drop, steppage, circumduction, or ataxic movements.        Assessment:  69 y.o.  male with HTN, Polycythemia Vera with course c/b recurrent strokes despite successful hematocrit control (Undergoing workup with repeat BMBx to differentiate PV vs. MDS), and L ICA ulcerated plaque but no significant stenosis. He had 2 clinical events first on ASA monotherapy and second on Plavix monotherapy, MRI also shows evidence of old silent infarcts.       Impression:  Older infarct could  have been 2/2 polycythemia, but most recent event in April appears to be due to artery-artery embolization from L ICA ulcerated plaque. He was placed back on DAPT (ASA/Plavix) in April and remain on both.     Plan:  - Continue DAPT for the time being due to plaque ulceration and recurrent events on single agent.   - For procedures attempt to keep on DAPT if able, if needing to hold Plavix hold it for shortest time possible and continue ASA.   - If bleeding becomes an issue in the future and we need to switch to single agent. We would switch him to Brilinta monotherapy instead given failure of both ASA and Plavix  - Obtain carotid U/S to assess plaque ulceration and allow for longitudinal monitoring of plaque   - Follow-up in 4 months      Arnoldo Oleary MD   Vascular Neurology Fellow PGY5  [facility]     I saw and evaluated the patient. I personally obtained the key and critical portions of the history and physical exam or was physically present for key and critical portions performed by the resident/fellow. I reviewed the resident/fellow's documentation and discussed the patient with the resident/fellow. I agree with the resident/fellow's medical decision making as documented in the note.    Deidre Pinzon MD

## 2024-11-18 NOTE — PATIENT INSTRUCTIONS
You were seen in the Stroke clinic to evaluate the cause of recurrent strokes.    The initial strokes that were incidentally found on MRI in 2023 were likely a result of a hypercoagulable state related to your polycythemia. However, the most recent one appears to be related to calcification and plaque in your left carotid artery forming a small clot that travelled up to the brain and fragmented into many small clots leading to scattered strokes. That means it might not be only related to your underlying polycythemia or myelodysplastic syndrome.     That being said, you have developed stroke on single antiplatelet therapy with Aspirin alone and with Plavix alone so we would rather keep on both indefinitely and closely monitor your carotid. For procedures that might cause bleeding, you want to maintain Aspirin and if you need to hold Plavix, hold it for the shortest amount of time possible.     In the future if you have a lot of bleeding issues and need to go down to a single agent, we would switch it to a different single agent called Ticagrelor (Brilinta).    We ordered a carotid ultrasound and want to see you back in 4 months.     Stroke prevention:  Eat a healthy diet with plenty of fresh fruits and vegetables.  Avoid salt and fried foods.  Lose weight and exercise on a regular basis.  Do not smoke or use drugs.  Be sure to take all medications.  Call 911 for signs of stroke (numbness or weakness on one side, trouble seeing on one side, trouble speaking (either because your speech is slurred or you can't find the words), sudden double vision, spinning sensation or loss of coordination).    To find a stroke support group: https://www.stroke.org/en/stroke-support-group-finder  To learn about Virtual Stroke Educations sessions: contact Gabriela Valente at: [email or web address]    For any questions, concerns, or to schedule an appointment, please call [phone].

## 2024-11-20 ENCOUNTER — HOSPITAL ENCOUNTER (OUTPATIENT)
Dept: RADIOLOGY | Facility: HOSPITAL | Age: 69
Discharge: HOME | End: 2024-11-20
Payer: MEDICARE

## 2024-11-20 VITALS
TEMPERATURE: 98.2 F | RESPIRATION RATE: 16 BRPM | SYSTOLIC BLOOD PRESSURE: 117 MMHG | OXYGEN SATURATION: 97 % | BODY MASS INDEX: 26.51 KG/M2 | WEIGHT: 200 LBS | HEART RATE: 60 BPM | HEIGHT: 73 IN | DIASTOLIC BLOOD PRESSURE: 65 MMHG

## 2024-11-20 DIAGNOSIS — Z76.82 STEM CELL TRANSPLANT CANDIDATE: ICD-10-CM

## 2024-11-20 DIAGNOSIS — D45 POLYCYTHEMIA VERA: ICD-10-CM

## 2024-11-20 PROCEDURE — 77012 CT SCAN FOR NEEDLE BIOPSY: CPT

## 2024-11-20 PROCEDURE — 99152 MOD SED SAME PHYS/QHP 5/>YRS: CPT

## 2024-11-20 PROCEDURE — 2720000007 HC OR 272 NO HCPCS

## 2024-11-20 PROCEDURE — 81450 HL NEO GSAP 5-50DNA/DNA&RNA: CPT | Performed by: STUDENT IN AN ORGANIZED HEALTH CARE EDUCATION/TRAINING PROGRAM

## 2024-11-20 PROCEDURE — 88275 CYTOGENETICS 100-300: CPT | Mod: 59 | Performed by: STUDENT IN AN ORGANIZED HEALTH CARE EDUCATION/TRAINING PROGRAM

## 2024-11-20 PROCEDURE — 7100000010 HC PHASE TWO TIME - EACH INCREMENTAL 1 MINUTE

## 2024-11-20 PROCEDURE — 88291 CYTO/MOLECULAR REPORT: CPT | Performed by: PATHOLOGY

## 2024-11-20 PROCEDURE — G0452 MOLECULAR PATHOLOGY INTERPR: HCPCS | Performed by: STUDENT IN AN ORGANIZED HEALTH CARE EDUCATION/TRAINING PROGRAM

## 2024-11-20 PROCEDURE — 99152 MOD SED SAME PHYS/QHP 5/>YRS: CPT | Performed by: RADIOLOGY

## 2024-11-20 PROCEDURE — 2500000004 HC RX 250 GENERAL PHARMACY W/ HCPCS (ALT 636 FOR OP/ED): Performed by: RADIOLOGY

## 2024-11-20 PROCEDURE — 85097 BONE MARROW INTERPRETATION: CPT | Mod: AHULAB | Performed by: STUDENT IN AN ORGANIZED HEALTH CARE EDUCATION/TRAINING PROGRAM

## 2024-11-20 PROCEDURE — 7100000009 HC PHASE TWO TIME - INITIAL BASE CHARGE

## 2024-11-20 RX ORDER — LIDOCAINE HYDROCHLORIDE 10 MG/ML
INJECTION, SOLUTION EPIDURAL; INFILTRATION; INTRACAUDAL; PERINEURAL
Status: COMPLETED | OUTPATIENT
Start: 2024-11-20 | End: 2024-11-20

## 2024-11-20 RX ORDER — MIDAZOLAM HYDROCHLORIDE 1 MG/ML
INJECTION INTRAMUSCULAR; INTRAVENOUS
Status: COMPLETED | OUTPATIENT
Start: 2024-11-20 | End: 2024-11-20

## 2024-11-20 RX ORDER — FENTANYL CITRATE 50 UG/ML
INJECTION, SOLUTION INTRAMUSCULAR; INTRAVENOUS
Status: COMPLETED | OUTPATIENT
Start: 2024-11-20 | End: 2024-11-20

## 2024-11-20 ASSESSMENT — PAIN - FUNCTIONAL ASSESSMENT
PAIN_FUNCTIONAL_ASSESSMENT: 0-10

## 2024-11-20 ASSESSMENT — PAIN SCALES - GENERAL
PAINLEVEL_OUTOF10: 0 - NO PAIN

## 2024-11-20 NOTE — PRE-PROCEDURE NOTE
Interventional Radiology Preprocedure Note    Indication for procedure: The encounter diagnosis was Polycythemia vera. For bone marrow biopsy.    Relevant review of systems: NA    Relevant Labs:   Lab Results   Component Value Date    CREATININE 0.88 10/18/2024    EGFR >90 10/18/2024    INR 1.1 10/18/2024    PROTIME 12.4 10/18/2024       Planned Sedation/Anesthesia: Moderate    Airway assessment: normal    Directed physical examination:    Awake and alert, oriented  No acute distress  Regular rate, rhythm  Breathing non-labored    Mallampati: II (hard and soft palate, upper portion of tonsils and uvula visible)    ASA Score: ASA 2 - Patient with mild systemic disease with no functional limitations    Benefits, risks and alternatives of procedure and planned sedation have been discussed with the patient and/or their representative. All questions answered and they agree to proceed.

## 2024-11-20 NOTE — DISCHARGE INSTRUCTIONS
Bone Marrow Biopsy and Aspiration    What it is  Bone marrow is the soft, spongy substance found inside the center of most bones. The bone marrow makes blood cells. If we need to look at the blood cells in your bone marrow, your doctor will order a bone marrow biopsy and aspiration. It is is often done in a hospital room or a procedure room in your doctor’s office. If it’s done in your doctor’s office, plan to be there at least 1 to 2 hours. This gives time for checking in, any needed blood work and the test.  During a bone marrow biopsy, a small sample of bone with bone marrow inside it is removed.  It’s most often taken from the hip bone. Sometimes it’s taken from the sternum (breastbone).  Before the biopsy, a small part of your skin and the surface of the bone under it are numbed with medicine. Then, a special wide needle is pushed into the bone. A liquid bone marrow sample is removed with a syringe that’s attached to the needle. The syringe is removed and the needle is turned to remove a sample of bone and bone marrow. All samples are sent to a lab to be looked at under a microscope. It may take the lab a few days to complete a report with your test results.    How to plan and what to expect  Before the test  ? Tell your doctor or nurse if you take aspirin or any blood thinning medicines like Coumadin(warfarin), Lovenox (enoxaparin) or Plavix (clopidogrel). Ask if you should change the way you take these medicines before or after your test.  ? You may eat and drink before the test.  ? If you will be given medicine to help you relax (called a sedative), you will need a family member or friend to drive you home.  [id number]    [facility], Revised 10/17  Bone Marrow Biopsy and Aspiration; [facility] 8 Page 2 of 2    During the test  ? You will lie on an exam table. If your hip is used, you will lie on your side or on your stomach. If your breastbone is used, you will lie on your back.  ? The skin on your lower  back or breastbone is cleaned and numbed with medicine. Then a needle is put into the bone to draw out the bone marrow. You may feel pressure. You may also have some brief pain or a burning feeling as the marrow is removed. It’s important to lie still during the test and breathe normally.      After the test  After the needle is taken out, 1 or 2 bandages are placed over the biopsy site. You can remove them the next day when you shower. Look at the biopsy site for the next 2 to 3 days.    You may need to use a mirror or have someone look for you. Check for these signs of infection or bleeding:  ? Pain or pressure around the site. It is normal for the site to feel a little tender for a few days.  ? Fever of 100.4°F (38°C) or higher  ? Shaking and/or chills  ? Pus or bad-smelling drainage from the site  ? Warmth, swelling and/or redness at the site  ? Any bleeding or a bruise larger than a half dollar. A small bruise is normal.    Call your doctor right away if you have any of the problems listed above, or if you have other concerns or questions.      Other things to know  If you’re given medicine to help you relax during the test (called a sedative), do not drive, operate or use any heavy machinery, or drink alcohol for 24 hours after the test.  This info is a general resource. It is not meant to replace your doctor’s advice. Ask your doctor or health care team any questions. Always follow their instructions.    Keep dressing on for 2 days. May shower, no baths or swimming. No heavy lifting or strenuous activity for 2-3 days post procedure.      Keep dressing on for 2 days.   Check the site for any signs of infection after removing the dressing. Y  ou may shower with the dressing on, but no baths/swimming/submerging underwater while dressing is in place.   No heavy lifting (max 10lbs) for 2-3 days following the procedure.   No strenuous activity or exercise for 1 week following the procedure.   If you have received  sedation today, no driving, operating heavy machinery, drinking alcohol or making any important decisions for 24 hours post procedure.

## 2024-11-20 NOTE — POST-PROCEDURE NOTE
Interventional Radiology Brief Postprocedure Note    Attending: Darshan Barr MD      Assistant: none    Diagnosis:   1. Polycythemia vera  CT bone marrow biopsy and aspirations only    CT bone marrow biopsy and aspirations only            Description of procedure: CT guided bone marrow aspiration and biopsy, left iliac bone.     Anesthesia:  MAC Moderate    Complications: None    Estimated Blood Loss: minimal    Medications (Filter: Administrations occurring from 0841 to 0927 on 11/20/24) As of 11/20/24 0927      fentaNYL PF (Sublimaze) injection (mcg) Total dose:  50 mcg      Date/Time Rate/Dose/Volume Action       11/20/24  0912 50 mcg Given               midazolam (Versed) injection (mg) Total dose:  2 mg      Date/Time Rate/Dose/Volume Action       11/20/24  0912 2 mg Given               lidocaine PF (Xylocaine) 10 mg/mL (1 %) injection (mL) Total volume:  10 mL      Date/Time Rate/Dose/Volume Action       11/20/24  0915 10 mL Given                   No specimens collected      See detailed result report with images in PACS.    The patient tolerated the procedure well without incident or complication and is in stable condition.

## 2024-11-25 PROCEDURE — 81379 HLA I TYPING COMPLETE HR: CPT | Mod: OUT | Performed by: STUDENT IN AN ORGANIZED HEALTH CARE EDUCATION/TRAINING PROGRAM

## 2024-11-27 ENCOUNTER — HOSPITAL ENCOUNTER (OUTPATIENT)
Dept: RADIOLOGY | Facility: CLINIC | Age: 69
Discharge: HOME | End: 2024-11-27
Payer: MEDICARE

## 2024-11-27 ENCOUNTER — LAB REQUISITION (OUTPATIENT)
Dept: LAB | Facility: CLINIC | Age: 69
End: 2024-11-27
Payer: MEDICARE

## 2024-11-27 DIAGNOSIS — I63.40 CEREBRAL INFARCTION DUE TO EMBOLISM OF CEREBRAL ARTERY (MULTI): ICD-10-CM

## 2024-11-27 DIAGNOSIS — C92.00 ACUTE MYELOBLASTIC LEUKEMIA, NOT HAVING ACHIEVED REMISSION (MULTI): ICD-10-CM

## 2024-11-27 DIAGNOSIS — I65.22 ULCERATED ATHEROSCLEROTIC PLAQUE OF LEFT CAROTID ARTERY: ICD-10-CM

## 2024-11-27 LAB
ELECTRONICALLY SIGNED BY: NORMAL
HLA CLS I TYP PNL BLD/T DONR HIGH RES: NORMAL
HLA RESULTS: NORMAL
HLA-DP2 QL: NORMAL
HLA-DQB1 HIGH RES: NORMAL
HLA-DRB1 HIGH RES: NORMAL
MYELOID NGS RESULTS: NORMAL

## 2024-11-27 PROCEDURE — 93880 EXTRACRANIAL BILAT STUDY: CPT | Performed by: RADIOLOGY

## 2024-11-27 PROCEDURE — 93880 EXTRACRANIAL BILAT STUDY: CPT

## 2024-12-02 LAB
CELL POPULATIONS: NORMAL
DIAGNOSIS: NORMAL
FLOW DIFFERENTIAL: NORMAL
FLOW TEST ORDERED: NORMAL
LAB TEST METHOD: NORMAL
NUMBER OF CELLS COLLECTED: NORMAL PER TUBE
PATH REPORT.COMMENTS IMP SPEC: NORMAL
PATH REPORT.FINAL DX SPEC: NORMAL
PATH REPORT.GROSS SPEC: NORMAL
PATH REPORT.MICROSCOPIC SPEC OTHER STN: NORMAL
PATH REPORT.RELEVANT HX SPEC: NORMAL
PATH REPORT.RELEVANT HX SPEC: NORMAL
PATH REPORT.TOTAL CANCER: NORMAL
PATH REPORT.TOTAL CANCER: NORMAL
SIGNATURE COMMENT: NORMAL
SPECIMEN VIABILITY: NORMAL

## 2024-12-02 PROCEDURE — 88341 IMHCHEM/IMCYTCHM EA ADD ANTB: CPT | Performed by: PATHOLOGY

## 2024-12-02 PROCEDURE — 88311 DECALCIFY TISSUE: CPT | Performed by: PATHOLOGY

## 2024-12-02 PROCEDURE — 88305 TISSUE EXAM BY PATHOLOGIST: CPT | Performed by: PATHOLOGY

## 2024-12-02 PROCEDURE — 88313 SPECIAL STAINS GROUP 2: CPT | Performed by: PATHOLOGY

## 2024-12-02 PROCEDURE — 88185 FLOWCYTOMETRY/TC ADD-ON: CPT | Mod: TC,AHULAB | Performed by: STUDENT IN AN ORGANIZED HEALTH CARE EDUCATION/TRAINING PROGRAM

## 2024-12-02 PROCEDURE — 88342 IMHCHEM/IMCYTCHM 1ST ANTB: CPT | Mod: TC,SUR,59,AHULAB | Performed by: STUDENT IN AN ORGANIZED HEALTH CARE EDUCATION/TRAINING PROGRAM

## 2024-12-02 PROCEDURE — 81379 HLA I TYPING COMPLETE HR: CPT | Mod: OUT | Performed by: STUDENT IN AN ORGANIZED HEALTH CARE EDUCATION/TRAINING PROGRAM

## 2024-12-02 PROCEDURE — 88189 FLOWCYTOMETRY/READ 16 & >: CPT | Performed by: STUDENT IN AN ORGANIZED HEALTH CARE EDUCATION/TRAINING PROGRAM

## 2024-12-02 PROCEDURE — 88342 IMHCHEM/IMCYTCHM 1ST ANTB: CPT | Performed by: PATHOLOGY

## 2024-12-03 ENCOUNTER — OFFICE VISIT (OUTPATIENT)
Dept: HEMATOLOGY/ONCOLOGY | Facility: CLINIC | Age: 69
End: 2024-12-03
Payer: MEDICARE

## 2024-12-03 ENCOUNTER — LAB (OUTPATIENT)
Dept: LAB | Facility: CLINIC | Age: 69
End: 2024-12-03
Payer: MEDICARE

## 2024-12-03 VITALS
TEMPERATURE: 97.5 F | OXYGEN SATURATION: 93 % | HEIGHT: 72 IN | BODY MASS INDEX: 28.25 KG/M2 | RESPIRATION RATE: 12 BRPM | WEIGHT: 208.56 LBS | DIASTOLIC BLOOD PRESSURE: 77 MMHG | HEART RATE: 78 BPM | SYSTOLIC BLOOD PRESSURE: 130 MMHG

## 2024-12-03 DIAGNOSIS — D45 POLYCYTHEMIA VERA: ICD-10-CM

## 2024-12-03 DIAGNOSIS — D45 POLYCYTHEMIA VERA: Primary | ICD-10-CM

## 2024-12-03 DIAGNOSIS — Z76.82 STEM CELL TRANSPLANT CANDIDATE: ICD-10-CM

## 2024-12-03 LAB
ABO GROUP (TYPE) IN BLOOD: NORMAL
ALBUMIN SERPL BCP-MCNC: 4.7 G/DL (ref 3.4–5)
ALP SERPL-CCNC: 47 U/L (ref 33–136)
ALT SERPL W P-5'-P-CCNC: 26 U/L (ref 10–52)
ANION GAP SERPL CALC-SCNC: 11 MMOL/L (ref 10–20)
ANTIBODY SCREEN: NORMAL
AST SERPL W P-5'-P-CCNC: 34 U/L (ref 9–39)
BASOPHILS # BLD MANUAL: 0 X10*3/UL (ref 0–0.1)
BASOPHILS NFR BLD MANUAL: 0 %
BILIRUB SERPL-MCNC: 0.9 MG/DL (ref 0–1.2)
BUN SERPL-MCNC: 22 MG/DL (ref 6–23)
CALCIUM SERPL-MCNC: 10.5 MG/DL (ref 8.6–10.6)
CHLORIDE SERPL-SCNC: 103 MMOL/L (ref 98–107)
CO2 SERPL-SCNC: 31 MMOL/L (ref 21–32)
CREAT SERPL-MCNC: 1.02 MG/DL (ref 0.5–1.3)
DACRYOCYTES BLD QL SMEAR: ABNORMAL
EGFRCR SERPLBLD CKD-EPI 2021: 80 ML/MIN/1.73M*2
EOSINOPHIL # BLD MANUAL: 0.85 X10*3/UL (ref 0–0.7)
EOSINOPHIL NFR BLD MANUAL: 5 %
ERYTHROCYTE [DISTWIDTH] IN BLOOD BY AUTOMATED COUNT: 18.9 % (ref 11.5–14.5)
GLUCOSE SERPL-MCNC: 90 MG/DL (ref 74–99)
HCT VFR BLD AUTO: 37.7 % (ref 41–52)
HGB BLD-MCNC: 12 G/DL (ref 13.5–17.5)
IMM GRANULOCYTES # BLD AUTO: 1.94 X10*3/UL (ref 0–0.7)
IMM GRANULOCYTES NFR BLD AUTO: 11.4 % (ref 0–0.9)
LYMPHOCYTES # BLD MANUAL: 1.7 X10*3/UL (ref 1.2–4.8)
LYMPHOCYTES NFR BLD MANUAL: 10 %
MCH RBC QN AUTO: 31.5 PG (ref 26–34)
MCHC RBC AUTO-ENTMCNC: 31.8 G/DL (ref 32–36)
MCV RBC AUTO: 99 FL (ref 80–100)
METAMYELOCYTES # BLD MANUAL: 0.85 X10*3/UL
METAMYELOCYTES NFR BLD MANUAL: 5 %
MONOCYTES # BLD MANUAL: 0.85 X10*3/UL (ref 0.1–1)
MONOCYTES NFR BLD MANUAL: 5 %
MYELOCYTES # BLD MANUAL: 0.17 X10*3/UL
MYELOCYTES NFR BLD MANUAL: 1 %
NEUTROPHILS # BLD MANUAL: 12.41 X10*3/UL (ref 1.2–7.7)
NEUTS BAND # BLD MANUAL: 0.85 X10*3/UL (ref 0–0.7)
NEUTS BAND NFR BLD MANUAL: 5 %
NEUTS SEG # BLD MANUAL: 11.56 X10*3/UL (ref 1.2–7)
NEUTS SEG NFR BLD MANUAL: 68 %
NRBC BLD-RTO: ABNORMAL /100{WBCS}
OVALOCYTES BLD QL SMEAR: ABNORMAL
PLATELET # BLD AUTO: 337 X10*3/UL (ref 150–450)
POTASSIUM SERPL-SCNC: 4.4 MMOL/L (ref 3.5–5.3)
PROT SERPL-MCNC: 7.1 G/DL (ref 6.4–8.2)
RBC # BLD AUTO: 3.81 X10*6/UL (ref 4.5–5.9)
RBC MORPH BLD: ABNORMAL
RH FACTOR (ANTIGEN D): NORMAL
SCHISTOCYTES BLD QL SMEAR: ABNORMAL
SODIUM SERPL-SCNC: 141 MMOL/L (ref 136–145)
TOTAL CELLS COUNTED BLD: 100
VARIANT LYMPHS # BLD MANUAL: 0.17 X10*3/UL (ref 0–0.5)
VARIANT LYMPHS NFR BLD: 1 %
WBC # BLD AUTO: 17 X10*3/UL (ref 4.4–11.3)

## 2024-12-03 PROCEDURE — 99215 OFFICE O/P EST HI 40 MIN: CPT | Performed by: STUDENT IN AN ORGANIZED HEALTH CARE EDUCATION/TRAINING PROGRAM

## 2024-12-03 PROCEDURE — 80053 COMPREHEN METABOLIC PANEL: CPT

## 2024-12-03 PROCEDURE — 86901 BLOOD TYPING SEROLOGIC RH(D): CPT

## 2024-12-03 PROCEDURE — 1126F AMNT PAIN NOTED NONE PRSNT: CPT | Performed by: STUDENT IN AN ORGANIZED HEALTH CARE EDUCATION/TRAINING PROGRAM

## 2024-12-03 PROCEDURE — 1159F MED LIST DOCD IN RCRD: CPT | Performed by: STUDENT IN AN ORGANIZED HEALTH CARE EDUCATION/TRAINING PROGRAM

## 2024-12-03 PROCEDURE — 3008F BODY MASS INDEX DOCD: CPT | Performed by: STUDENT IN AN ORGANIZED HEALTH CARE EDUCATION/TRAINING PROGRAM

## 2024-12-03 PROCEDURE — 85007 BL SMEAR W/DIFF WBC COUNT: CPT

## 2024-12-03 PROCEDURE — 85027 COMPLETE CBC AUTOMATED: CPT

## 2024-12-03 PROCEDURE — 36415 COLL VENOUS BLD VENIPUNCTURE: CPT

## 2024-12-03 ASSESSMENT — ENCOUNTER SYMPTOMS
CARDIOVASCULAR NEGATIVE: 1
ENDOCRINE NEGATIVE: 1
RESPIRATORY NEGATIVE: 1
PSYCHIATRIC NEGATIVE: 1
BRUISES/BLEEDS EASILY: 1
GASTROINTESTINAL NEGATIVE: 1
EYES NEGATIVE: 1
CONSTITUTIONAL NEGATIVE: 1

## 2024-12-03 ASSESSMENT — PAIN SCALES - GENERAL: PAINLEVEL_OUTOF10: 0-NO PAIN

## 2024-12-03 NOTE — PROGRESS NOTES
FUV completed. No return FUV needed with Dr Julio at this time per Dr. Julio. Pt may call for any needs or concerns that arise and will follow with his other provider.

## 2024-12-04 ENCOUNTER — TELEPHONE (OUTPATIENT)
Dept: NEUROLOGY | Facility: HOSPITAL | Age: 69
End: 2024-12-04

## 2024-12-04 NOTE — TUMOR BOARD NOTE
TUMOR BOARD DISCUSSION SUMMARY    PRESENTER: Dr. Susi Julio    DIAGNOSIS: Polycythemia     SUMMARY/PRESENTATION: Valeriy Negron is a 69 y.o. male patient who presents with      History: Long history of P vera since 1/2020, Strokes since April 2023    Previous treatment: jakafi, hydroxyurea      Information reviewed: Pathology Review    Radiology:     Not reviewed    Pathology: Bone Marrow Biopsy 11/20/24  -- HYPERCELLULAR MARROW (60-70% CELLULAR) WITH FEATURES COMPATIBLE WITH POLYCYTHEMIA VERA, JAK2 POSITIVE  -- MATURING TRILINEAGE HEMATOPOIESIS   -- BLASTS 2-3%     NOTE: The aspirate smears are aspicular, however, in the cells evaluable features of dysplasia are not seen. There is no morphologic features of significant dysplasia for a myelodysplastic syndrome. Overall, findings are supportive of the above diagnosis.        RECOMMENDATIONS: control wbc count, low suspicion for disease transformation or MDS/PN overlap    It appears that strokes occurring after hematocrit control was achieved are likely related to atherosclerotic disease than to PV per neurology note.  I will confirm this understanding with Dr. Pinzon.      Disclaimer     [facility] tumor board recommendations represent the consensus opinion of physicians present at a weekly patient care conference. The treating [facility] physician is not always present, and many of the physicians formulating the recommendation have not personally seen or examined the patient under discussion. It is understood that the treating [facility] physician considers the expertise of the Tumor Board Recommendation in formulating his/her plan for the patient. However, in many situations, based on individualized patient considerations, a different plan is determined by the treating physician to be the optimal medical management.     Scribe Attestation  By signing my name below, IFrancisco Scribe   attest that this documentation has been prepared under the direction and in the presence of  MALIGNANT HEME TUMOR BOARD.

## 2024-12-04 NOTE — PROGRESS NOTES
Patient ID: Valeriy Negron is a 69 y.o. male.  Referring Physician: Susi Julio MD PhD  [id number] [id number]  Primary Care Provider: Fernando Valencia MD    Oncology History Overview Note   PV Diagnosis:  - Increased immature granulocytes starting in 2015  - CBC 1/2020:  WBC 12.6, eos 3.226, basos 4.03, Hgb 18.2, plts 416  - CBC 8/11/21:  WBC 12.6, ANC 8.6, Eos 2.91, basos 0.62, Hgb 18.1, plts 443  - Referred to Dr. Mishra ([facility]), evaluated 9/3/21.  Epo 1.4 mIU/mL, folate normal  - BMBx 10/8/21 was hypercellular (50-60%) with panmyelosis, MK atypia, no increase in blasts, no increase in reticulin fibrosis (MF-0), and JAK2 V617F mutation by myeloid NGS consistent with JAK2 positive MPN, most consistent with PV.  Other mutation by NGS:  SF3B1 K666N (associated with inferior survival in ET).  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  JAK2 V617F mut detected.  FISH neg for rearrangements in PDGFRa, PDGFRb, FGFR1, BCR/ABL.  CBC:  WBC 17.4 (no blasts), Hgb 11.2 (MCV 96.7), pts 410.  - BMBx 11/20/24:  60-70% cellular with features compatible with PV.  Grade 1-2 reticulin fibrosis.  Blasts 3-4%.  NGS:  JAK2 V617F (VAF 14%), SF3B1 (VAF 45%).  MDS FISH pending.    PV Management:  Phlebotomy 10/22/21-1/2022  Hydrea 500 BID 11/2021-9/2023  ASA held 2023, resumed 4/2024  Rux 9/2023-present (10mg BID -> 15 mg BID)    CVA History:  4/2023:  corona radiata stroke.  Remembers he was watching TV, went to get up, had a hard time getting up, then fell.  Got up, tried to take another step and fell.  Went into the bedroom, fell, took 1 day off, then went back to work the next day.  Saw GP on Saturday, had PT, started Plavix and statin.  Referred to a neurologist, was told he had imaging evidence of a previous CVA as well.      4/2024:  third CVA.  Went to the gym that morning.  Couldn't move  "right side.  Called squad, distal L A2 occlusion, got thrombolysis.  Loop recorder 6 weeks later.     Polycythemia vera   10/21/2024 Initial Diagnosis    Polycythemia vera            Subjective      Mr. Negron presents today to discuss interim test results for polycythemia vera and recurrent strokes.  Diagnosis and treatment history summarized above.    Since last visit, he was evaluated by stroke neurology and underwent repeat BMBx, as well as HLA typing for possible allo transplant.    He tolerated the BMBx well without bleeding.  After neurology visit, he underwent carotid ultrasounds (< 50% stenosis bilaterally).      KPS:  He has an excellent performance status; he recently returned from a week of golf in [place].  Ready to go back to the gym (elliptical, rowing machine, lifts weights \"a little\").  Headed to [employer] in a few weeks.  He helps babysit his grandson who is 22 months old.        PMH:  HTN     PSH:  5 knee surgeries (b/l TKA)     FH:  MGM - MI  Mother -  of lymphoma age 85 (unknown subtype, was in the abdomen)  Father -  of DM complications when pt was 6 yo  Sister - psoriasis  Half sister and half brother - healthy  Son - 37 yo, healthy     SH:  lives with wife  works as an , denies occupational exposures  never smoker  drinks wine occasionally  no illicits  no pets    Review of Systems   Constitutional: Negative.    HENT:  Negative.     Eyes: Negative.    Respiratory: Negative.     Cardiovascular: Negative.    Gastrointestinal: Negative.    Endocrine: Negative.    Genitourinary: Negative.     Musculoskeletal:  Positive for gait problem.   Skin: Negative.    Neurological:  Positive for gait problem.        Short term memory difficulties   Hematological:  Bruises/bleeds easily.   Psychiatric/Behavioral: Negative.           Objective   BSA: 2.19 meters squared  /77   Pulse 78   Temp 36.4 °C (97.5 °F)   Resp 12   Ht 1.833 m (6' 0.17\")   Wt 94.6 kg (208 lb 8.9 oz)   " SpO2 93%   BMI 28.16 kg/m²       Physical Exam  Vitals reviewed.   HENT:      Head: Normocephalic and atraumatic.      Right Ear: External ear normal.      Left Ear: External ear normal.      Nose: Nose normal.      Mouth/Throat:      Mouth: Mucous membranes are moist.      Pharynx: Oropharynx is clear.   Eyes:      Extraocular Movements: Extraocular movements intact.      Conjunctiva/sclera: Conjunctivae normal.      Pupils: Pupils are equal, round, and reactive to light.   Cardiovascular:      Rate and Rhythm: Normal rate and regular rhythm.   Pulmonary:      Effort: Pulmonary effort is normal.      Breath sounds: Normal breath sounds.   Abdominal:      Palpations: Abdomen is soft.      Tenderness: There is no abdominal tenderness.   Musculoskeletal:         General: Normal range of motion.      Cervical back: Normal range of motion.   Skin:     General: Skin is warm and dry.   Neurological:      General: No focal deficit present.      Mental Status: He is alert.   Psychiatric:         Mood and Affect: Mood normal.         Behavior: Behavior normal.         Thought Content: Thought content normal.       CT a/p 5/2024:  no splenomegaly    Results from last 7 days   Lab Units 12/03/24  1133   WBC AUTO x10*3/uL 17.0*   HEMOGLOBIN g/dL 12.0*   HEMATOCRIT % 37.7*   PLATELETS AUTO x10*3/uL 337   LYMPHO PCT MAN % 10.0   MONO PCT MAN % 5.0   EOSINO PCT MAN % 5.0       Results from last 7 days   Lab Units 12/03/24  1133   SODIUM mmol/L 141   POTASSIUM mmol/L 4.4   CHLORIDE mmol/L 103   CO2 mmol/L 31   BUN mg/dL 22   CREATININE mg/dL 1.02   CALCIUM mg/dL 10.5   PROTEIN TOTAL g/dL 7.1   BILIRUBIN TOTAL mg/dL 0.9   ALK PHOS U/L 47   ALT U/L 26   AST U/L 34   GLUCOSE mg/dL 90              Performance Status:  Symptomatic; fully ambulatory    Assessment/Plan      We discussed the following:    Repeat BMBx does NOT show evidence of transition to MF or of dysplasia suggesting an MDS/MPN overlap.  I will review the findings in  tumor board to verify that there are no subtleties or nuances that would change this impression.  It appears that strokes occurring after hematocrit control was achieved are likely related to atherosclerotic disease than to PV per neurology note.  I will confirm this understanding with Dr. Pinzon.  Workup for secondary vWD was negative; suspect bleeding is primarily related to DAPT.  If there is no evidence of MDS or MF, and strokes can be explained by a non-PV etiology, then allo transplant is likely not indicated for PV management. Moreover, it may confer unnecessary risk to the patient as it would likely require cessation of DAPT for weeks to months, resulting in increased short term stroke risk.    Plan:  - I will call pt after tumor board to confirm low suspicion for disease transformation or MDS/MPN overlap  - Hold off on allo transplant planning until PV control is lost, or pt transforms to MF  - I will discuss with Dr. Mishra whether to attempt to lower WBC count (by increasing rux or adding HU) in an attempt to further reduce the risk of thrombosis     Follow up as needed.    Susi Julio MD PhD

## 2024-12-05 ENCOUNTER — TUMOR BOARD CONFERENCE (OUTPATIENT)
Dept: HEMATOLOGY/ONCOLOGY | Facility: HOSPITAL | Age: 69
End: 2024-12-05
Payer: MEDICARE

## 2024-12-05 ENCOUNTER — LAB REQUISITION (OUTPATIENT)
Dept: LAB | Facility: CLINIC | Age: 69
End: 2024-12-05
Payer: MEDICARE

## 2024-12-05 DIAGNOSIS — D45 POLYCYTHEMIA VERA: ICD-10-CM

## 2024-12-05 LAB
CHROM ANALY OVERALL INTERP-IMP: NORMAL
ELECTRONICALLY COSIGNED BY CYTOGENETICS: NORMAL
ELECTRONICALLY SIGNED BY CYTOGENETICS: NORMAL
FISH ISCN RESULTS: NORMAL
HLA CLS I TYP PNL BLD/T DONR HIGH RES: NORMAL
HLA RESULTS: NORMAL
HLA-DP2 QL: NORMAL
HLA-DQB1 HIGH RES: NORMAL
HLA-DRB1 HIGH RES: NORMAL

## 2024-12-18 ENCOUNTER — LAB REQUISITION (OUTPATIENT)
Dept: LAB | Facility: CLINIC | Age: 69
End: 2024-12-18
Payer: MEDICARE

## 2024-12-18 DIAGNOSIS — D45 POLYCYTHEMIA VERA: ICD-10-CM

## 2024-12-30 ENCOUNTER — LAB REQUISITION (OUTPATIENT)
Dept: LAB | Facility: CLINIC | Age: 69
End: 2024-12-30
Payer: MEDICARE

## 2024-12-30 DIAGNOSIS — D45 POLYCYTHEMIA VERA: ICD-10-CM

## 2025-01-30 ENCOUNTER — TELEMEDICINE (OUTPATIENT)
Dept: NEUROLOGY | Facility: HOSPITAL | Age: 70
End: 2025-01-30
Payer: MEDICARE

## 2025-01-30 DIAGNOSIS — I65.22 ULCERATED ATHEROSCLEROTIC PLAQUE OF LEFT CAROTID ARTERY: ICD-10-CM

## 2025-01-30 DIAGNOSIS — I63.9 ISCHEMIC STROKE (MULTI): Primary | ICD-10-CM

## 2025-01-30 DIAGNOSIS — D45 POLYCYTHEMIA VERA: ICD-10-CM

## 2025-01-30 PROCEDURE — G2211 COMPLEX E/M VISIT ADD ON: HCPCS | Performed by: STUDENT IN AN ORGANIZED HEALTH CARE EDUCATION/TRAINING PROGRAM

## 2025-01-30 PROCEDURE — 99215 OFFICE O/P EST HI 40 MIN: CPT | Mod: 95 | Performed by: STUDENT IN AN ORGANIZED HEALTH CARE EDUCATION/TRAINING PROGRAM

## 2025-01-30 PROCEDURE — 99215 OFFICE O/P EST HI 40 MIN: CPT | Performed by: STUDENT IN AN ORGANIZED HEALTH CARE EDUCATION/TRAINING PROGRAM

## 2025-01-30 NOTE — PROGRESS NOTES
Vascular Neurology:    HPI: Valeriy Negron is a 69 y.o. right handed male with HTN and polycythemia vera  presents to stroke clinic for ischemic stroke follow up.     Patient has a history of multiple ischemic strokes. He had 2 clinical events first on ASA monotherapy (with uncontrolled hematocrit) and second on Plavix monotherapy (controlled hematocrit), MRI also shows evidence of old silent infarcts. Initial event likely in the setting of uncontrolled hematocrit, with subsequent/most recent stroke (despite hematocrit control and antiplatelet therapy) likely artery-to-artery etiology in the setting of ruptured L ICA plaque.     He has been on DAPT since his most recent event without any new neurological symptoms. He reports frequent/easy bruising but no other bleeding issues. He recently underwent bone marrow biopsy without evidence of dysplasia (to suggest MDS/MPN overlap) or transition to MF. His WBC count has been increasing, and restarting hydrea is being considered. He reports mild weakness/foot drag in his RLE when walking since his last stroke, otherwise doing well.     ROS: All systems reviewed and are negative other than mentioned in HPI    PMH:   Past Medical History:   Diagnosis Date    Hypertension     Stroke (Multi)         PSH:   Past Surgical History:   Procedure Laterality Date    JOINT REPLACEMENT Bilateral 2012        Allergies: No Known Allergies     FH: No family history on file.     SH: Patient denies illicit drug use    Objective:    There were no vitals taken for this visit.     Medications:    Current Outpatient Medications:     amLODIPine (Norvasc) 5 mg tablet, Take by mouth once daily., Disp: , Rfl:     aspirin 81 mg EC tablet, Take 1 tablet (81 mg) by mouth once daily., Disp: , Rfl:     atorvastatin (Lipitor) 80 mg tablet, Take 1 tablet (80 mg) by mouth once daily., Disp: , Rfl:     clopidogrel (Plavix) 75 mg tablet, Take 1 tablet (75 mg) by mouth once daily., Disp: , Rfl:      "fexofenadine (Allegra) 180 mg tablet, Take 1 tablet (180 mg) by mouth once daily., Disp: , Rfl:     fluticasone (Flonase) 50 mcg/actuation nasal spray, Administer 1 spray into each nostril once daily. Shake gently. Before first use, prime pump. After use, clean tip and replace cap., Disp: , Rfl:     glucosamine-chondroitin 500-400 mg tablet, Take 1 tablet by mouth once daily., Disp: , Rfl:     losartan-hydrochlorothiazide (Hyzaar) 100-12.5 mg tablet, Take 1 tablet by mouth once daily., Disp: , Rfl:     multivitamin with minerals iron-free (Centrum Silver), Take 1 tablet by mouth once daily., Disp: , Rfl:     omega 3-dha-epa-fish oil (Fish OiL) 1,000 (120-180) mg capsule, Take 1 capsule (1,000 mg) by mouth once daily., Disp: , Rfl:     ruxolitinib (Jakafi) 15 mg tablet, Take one (1) tablet by mouth twice a day, Disp: 60 tablet, Rfl: 5     Labs:  No results found for: \"CHOL\", \"TRIG\", \"HDL\", \"CHHDL\", \"LDLF\", \"VLDL\", \"NHDL\"  Lab Results   Component Value Date    HGBA1C 5.9 (H) 04/05/2024       MRI:  MR brain wo IV contrast    Result Date: 4/7/2024  IMPRESSION: Multiple small acute to subacute left frontal lobe infarcts with hemorrhagic transformation.  No significant mass effect. Punctate subacute left parietal and left occipital lobe infarcts. Chronic infarcts as described. Moderate white matter microvascular ischemic changes. : [facility]   Transcribe Date/Time: Apr 7 2024  8:38A Dictated by : DEEPIKA STEVENS MD This examination was interpreted and the report reviewed and electronically signed by:   DEEPIKA STEVENS MD on Apr 7 2024  8:51AM  EST     CT:  CT angio head w and wo IV contrast    Result Date: 4/5/2024  IMPRESSION: Occlusion of the distal A2 segment left anterior cerebral artery. No occlusive disease or significant stenosis elsewhere. COMMUNICATION:  Communicated with LAKSHMI BABCOCK on 4/5/2024 1:59 AM   via verbal communication. Arterial blood flow was measured to detect acute large vessel " occlusion by computer aided detection software: None. Concordance between software and imaging review: Discordant. : [facility]   Transcribe Date/Time: Apr 5 2024  1:40A Dictated by : TINO TOSCANO MD This examination was interpreted and the report reviewed and electronically signed by: TINO TOSCANO MD on Apr 5 2024  2:01AM  EST     Carotid US:  Irregular atherosclerotic plaque without significantly elevated velocities (<50% stenosis) bilaterally.     Imaging was personally reviewed and I agree with the impression stated above.     NIHSS: 1 (facial droop)        Neurologic Exam:  Examination limited by virtual visit  A&Ox3 to person/place/time, EOMI, slight R facial droop, facial sensation intact bilaterally, BUE strength 5/5, BLE without drift however patient reports mild RLE weakness since stroke when walking(no drift seen), SILT x4, outstretched finger-nose intact bilaterally      Assessment/Plan:  69 y.o.  male with HTN, Polycythemia Vera, and multiple ischemic strokes. Initial event likely in the setting of uncontrolled hematocrit, with subsequent/most recent stroke (despite hematocrit control and antiplatelet therapy) likely artery-to-artery etiology in the setting of ruptured L ICA plaque.     -Continue DAPT and statin  -Continue to monitor / maintain hematocrit control (managed by Heme/onc)  -Repeat CUS in 1 year  -Virtual clinic follow-up in 1 year after CUS  -Educated regarding stroke risk factors, stroke prevention, and stroke recognition    Deidre Pinzon MD     Virtual or Telephone Consent    An interactive audio and video telecommunication system which permits real time communications between the patient (at the originating site) and provider (at the distant site) was utilized to provide this telehealth service.   Verbal consent was requested and obtained from Valeriy Negron on this date, 01/30/25 for a telehealth visit.       I personally spent 40 minutes today, exclusive of procedures,  providing care for this patient, including preparation, face to face time, documentation and other services such as review of medical records, diagnostic result, patient education, counseling, coordination of care as specified in the encounter.

## 2025-02-06 ENCOUNTER — APPOINTMENT (OUTPATIENT)
Dept: NEUROLOGY | Facility: HOSPITAL | Age: 70
End: 2025-02-06
Payer: MEDICARE

## 2025-02-18 LAB
BAND RESOLUTION: 400 BANDS
CHROM ANALY OVERALL INTERP-IMP: NORMAL
CHROMOSOME ANALYSIS CELLS ANALYZED: 15 CELLS
CHROMOSOME ANALYSIS CELLS IMAGED: 4 CELLS
CHROMOSOME ANALYSIS HYPERMODAL CELL COUNT: 0 CELLS
CHROMOSOME ANALYSIS HYPOMODAL CELL COUNT: 0 CELLS
CHROMOSOME ANALYSIS MODAL CHROMOSOME NO: 46 CHROMOSOMES
CHROMOSOME ANALYSIS STAINING METHOD: NORMAL
ELECTRONICALLY SIGNED BY CYTOGENETICS: NORMAL
KARYOTYP MAR: 2 CELLS
TOTAL CELLS COUNTED MAR: 15 CELLS